# Patient Record
Sex: MALE | Race: WHITE | Employment: FULL TIME | URBAN - METROPOLITAN AREA
[De-identification: names, ages, dates, MRNs, and addresses within clinical notes are randomized per-mention and may not be internally consistent; named-entity substitution may affect disease eponyms.]

---

## 2024-08-01 ENCOUNTER — OFFICE VISIT (OUTPATIENT)
Age: 34
End: 2024-08-01

## 2024-08-01 VITALS
WEIGHT: 170 LBS | DIASTOLIC BLOOD PRESSURE: 84 MMHG | TEMPERATURE: 98.1 F | OXYGEN SATURATION: 98 % | HEIGHT: 69 IN | SYSTOLIC BLOOD PRESSURE: 134 MMHG | HEART RATE: 78 BPM | BODY MASS INDEX: 25.18 KG/M2 | RESPIRATION RATE: 18 BRPM

## 2024-08-01 DIAGNOSIS — J06.9 UPPER RESPIRATORY TRACT INFECTION, UNSPECIFIED TYPE: Primary | ICD-10-CM

## 2024-08-01 LAB
INFLUENZA A ANTIGEN, POC: NEGATIVE
INFLUENZA B ANTIGEN, POC: NEGATIVE
Lab: ABNORMAL
PERFORMING INSTRUMENT: ABNORMAL
QC PASS/FAIL: ABNORMAL
SARS-COV-2, POC: DETECTED
STREP PYOGENES DNA, POC: NEGATIVE
VALID INTERNAL CONTROL, POC: YES

## 2024-08-01 ASSESSMENT — ENCOUNTER SYMPTOMS: FLU SYMPTOMS: 1

## 2024-08-01 NOTE — PROGRESS NOTES
Omar Ingram (:  1990) is a 33 y.o. male,New patient, here for evaluation of the following chief complaint(s):  Influenza (Flu like symtoms, sore throat, runny nose, coughing, headache. Started Monday afternoon. Dry cough, scratching throat.)        SUBJECTIVE/OBJECTIVE:    History provided by:  Patient  Influenza         33 y.o. male presents with symptoms of sore throat, runny nose, nasal congestion, headache, some nausea but that went away, and dry cough. No fever, chills. No history of asthma or COPD. Taking Advil cold and flu.    Works from home.         Vitals:    24 1344   BP: 134/84   Site: Left Upper Arm   Position: Sitting   Cuff Size: Large Adult   Pulse: 78   Resp: 18   Temp: 98.1 °F (36.7 °C)   TempSrc: Oral   SpO2: 98%   Weight: 77.1 kg (170 lb)   Height: 1.753 m (5' 9\")       Results for orders placed or performed in visit on 24   POCT COVID-19, Antigen   Result Value Ref Range    SARS-COV-2, POC Detected (A) Not Detected    Lot Number [id number]     QC Pass/Fail pass     Performing Instrument BinaxNOW    AMB POC STREP GO A DIRECT, DNA PROBE   Result Value Ref Range    Valid Internal Control, POC yes     Strep pyogenes DNA, POC Negative    POCT Influenza A/B Antigen   Result Value Ref Range    Inflenza A Ag negative     Influenza B Ag negative         Physical Exam  Constitutional:       General: He is not in acute distress.     Appearance: Normal appearance. He is not ill-appearing or toxic-appearing.   HENT:      Head: Normocephalic and atraumatic.      Right Ear: Tympanic membrane, ear canal and external ear normal.      Left Ear: Tympanic membrane, ear canal and external ear normal.      Nose: Congestion present.      Mouth/Throat:      Mouth: Mucous membranes are moist.      Pharynx: Posterior oropharyngeal erythema present. No oropharyngeal exudate.   Eyes:      General:         Right eye: No discharge.         Left eye: No discharge.      Conjunctiva/sclera: Conjunctivae

## 2024-08-01 NOTE — PATIENT INSTRUCTIONS
Covid-19 -  Treatment is supportive  Recommend over the counter cough and cold remedies, including an antihistamine and decongestant to help with congestion  For cough recommend Robitussin or Delsym  Drink plenty fluids  CDC is recommending 5 days isolation  Call or return to clinic if no improvement or any worsening

## 2025-07-12 ENCOUNTER — OFFICE VISIT (OUTPATIENT)
Age: 35
End: 2025-07-12

## 2025-07-12 VITALS
TEMPERATURE: 98.7 F | HEART RATE: 82 BPM | OXYGEN SATURATION: 95 % | RESPIRATION RATE: 16 BRPM | HEIGHT: 68 IN | SYSTOLIC BLOOD PRESSURE: 115 MMHG | BODY MASS INDEX: 25.64 KG/M2 | DIASTOLIC BLOOD PRESSURE: 82 MMHG | WEIGHT: 169.2 LBS

## 2025-07-12 DIAGNOSIS — H10.31 ACUTE BACTERIAL CONJUNCTIVITIS OF RIGHT EYE: Primary | ICD-10-CM

## 2025-07-12 RX ORDER — TOBRAMYCIN 3 MG/ML
1 SOLUTION/ DROPS OPHTHALMIC EVERY 4 HOURS
Qty: 5 ML | Refills: 0 | Status: SHIPPED | OUTPATIENT
Start: 2025-07-12 | End: 2025-07-22

## 2025-07-12 RX ORDER — LISDEXAMFETAMINE DIMESYLATE 20 MG/1
CAPSULE ORAL
COMMUNITY
Start: 2025-07-09

## 2025-07-12 ASSESSMENT — ENCOUNTER SYMPTOMS
EYE DISCHARGE: 1
SORE THROAT: 0
EYE PAIN: 0
WHEEZING: 0
EYE REDNESS: 1
VOMITING: 0
DIARRHEA: 0
SHORTNESS OF BREATH: 0
FACIAL SWELLING: 0
GASTROINTESTINAL NEGATIVE: 1
RESPIRATORY NEGATIVE: 1
NAUSEA: 0

## 2025-07-12 NOTE — PROGRESS NOTES
[place] normal.      Nose: Nose normal.      Mouth/Throat:      Mouth: Mucous membranes are moist.      Pharynx: Oropharynx is clear.   Eyes:      General: No scleral icterus.        Right eye: Discharge present.         Left eye: No discharge.      Extraocular Movements: Extraocular movements intact.      Pupils: Pupils are equal, round, and reactive to light.      Comments: Erythema of conjunctiva   Cardiovascular:      Rate and Rhythm: Normal rate and regular rhythm.   Pulmonary:      Effort: Pulmonary effort is normal.      Breath sounds: Normal breath sounds.   Musculoskeletal:      Cervical back: Neck supple.   Neurological:      Mental Status: He is alert.        Vitals:    07/12/25 1104   BP: 120/81   BP Site: Right Upper Arm   Patient Position: Sitting   BP Cuff Size: Large Adult   Pulse: 82   Resp: 16   Temp: 98.7 °F (37.1 °C)   TempSrc: Oral   SpO2: 95%   Weight: 76.7 kg (169 lb 3.2 oz)   Height: 1.727 m (5' 8\")        Allergies   Allergen Reactions    Amoxicillin Nausea And Vomiting       Current Outpatient Medications   Medication Sig Dispense Refill    Lisdexamfetamine Dimesylate (VYVANSE) 20 MG CAPS        No current facility-administered medications for this visit.        Past Medical History:   Diagnosis Date    ADD (attention deficit disorder)         No past surgical history on file.     Social History:   Social Connections: Not on file        No care team member to display    There is no problem list on file for this patient.       Procedures   Vital signs stable  Patient in no acute distress    Topical anesthetic was instilled with good anesthesia using 1 GTT of 0.5% tetracaine. Fluoroscein stain of the eye was performed with no uptake of dye. No epithelial defect was noted. No foreign body, ulcer or dendritic lesions. Upper eyelid was everted, no foreign bodies or lesions were noted. No Hilario sign. Patient tolerated procedure well, no adverse reaction or complications.    Patient presents with